# Patient Record
Sex: FEMALE | Race: WHITE | NOT HISPANIC OR LATINO
[De-identification: names, ages, dates, MRNs, and addresses within clinical notes are randomized per-mention and may not be internally consistent; named-entity substitution may affect disease eponyms.]

---

## 2017-08-08 ENCOUNTER — RECORDS - HEALTHEAST (OUTPATIENT)
Dept: ADMINISTRATIVE | Facility: OTHER | Age: 67
End: 2017-08-08

## 2017-08-11 ENCOUNTER — OFFICE VISIT - HEALTHEAST (OUTPATIENT)
Dept: SURGERY | Facility: CLINIC | Age: 67
End: 2017-08-11

## 2017-08-11 DIAGNOSIS — Z85.3 PERSONAL HISTORY OF BREAST CANCER: ICD-10-CM

## 2017-08-11 RX ORDER — GUAIFENESIN AND DEXTROMETHORPHAN HYDROBROMIDE 1200; 60 MG/1; MG/1
TABLET, EXTENDED RELEASE ORAL
Status: SHIPPED | COMMUNITY
Start: 2017-08-11

## 2017-08-11 RX ORDER — FEXOFENADINE HCL 180 MG/1
180 TABLET ORAL
Status: SHIPPED | COMMUNITY
Start: 2013-05-20

## 2017-08-11 RX ORDER — EPINEPHRINE 0.3 MG/.3ML
0.3 INJECTION SUBCUTANEOUS
Status: SHIPPED | COMMUNITY
Start: 2016-10-05

## 2017-08-11 RX ORDER — MONTELUKAST SODIUM 10 MG/1
10 TABLET ORAL
Status: SHIPPED | COMMUNITY
Start: 2017-04-13

## 2017-08-11 RX ORDER — ACETIC ACID 20.65 MG/ML
SOLUTION AURICULAR (OTIC)
Status: SHIPPED | COMMUNITY
Start: 2016-12-16

## 2017-08-11 ASSESSMENT — MIFFLIN-ST. JEOR: SCORE: 1238.23

## 2018-08-16 ENCOUNTER — HOSPITAL ENCOUNTER (OUTPATIENT)
Dept: SURGERY | Facility: CLINIC | Age: 68
Discharge: HOME | End: 2018-08-16
Attending: SPECIALIST

## 2018-08-16 DIAGNOSIS — Z85.3 PERSONAL HISTORY OF BREAST CANCER: ICD-10-CM

## 2019-08-15 ENCOUNTER — RECORDS - HEALTHEAST (OUTPATIENT)
Dept: ADMINISTRATIVE | Facility: OTHER | Age: 69
End: 2019-08-15

## 2019-09-26 ENCOUNTER — HOSPITAL ENCOUNTER (OUTPATIENT)
Dept: SURGERY | Facility: CLINIC | Age: 69
Discharge: HOME | End: 2019-09-26
Attending: SPECIALIST

## 2019-09-26 DIAGNOSIS — Z85.3 PERSONAL HISTORY OF BREAST CANCER: ICD-10-CM

## 2019-09-26 ASSESSMENT — MIFFLIN-ST. JEOR: SCORE: 1257.51

## 2021-05-24 ENCOUNTER — RECORDS - HEALTHEAST (OUTPATIENT)
Dept: ADMINISTRATIVE | Facility: CLINIC | Age: 71
End: 2021-05-24

## 2021-05-26 ENCOUNTER — RECORDS - HEALTHEAST (OUTPATIENT)
Dept: ADMINISTRATIVE | Facility: CLINIC | Age: 71
End: 2021-05-26

## 2021-05-28 ENCOUNTER — RECORDS - HEALTHEAST (OUTPATIENT)
Dept: ADMINISTRATIVE | Facility: CLINIC | Age: 71
End: 2021-05-28

## 2021-05-31 VITALS — WEIGHT: 157.5 LBS | BODY MASS INDEX: 25.31 KG/M2 | HEIGHT: 66 IN

## 2021-06-01 VITALS — BODY MASS INDEX: 25.4 KG/M2 | WEIGHT: 155 LBS

## 2021-06-01 NOTE — PROGRESS NOTES
"[facility] for [facility] follow up appointment with Dr. Higginbotham.  She is many years out from her diagnosis of breast cancer.  She continues to see Dr. Rivera for medical oncology. She is feeling good.  She has no new breast concerns.   RN assessment and EMR update.  /78 (Patient Site: Left Arm, Patient Position: Sitting)   Pulse 94   Resp 16   Ht 5' 6\" (1.676 m)   Wt 160 lb (72.6 kg)   SpO2 95%   BMI 25.82 kg/m  .  Patient met with Dr. Higginbotham, see dictation for details.  She will plan to come see Dr. Higginbotham, per her choice, in a year with mammogram.  RN time 12 mins  "

## 2021-06-01 NOTE — PROGRESS NOTES
"This is a 69 y.o. woman who comes in for  continued follow-up of her right breast cancer.  She is now many years  status post right partial mastectomy with radiation.  She is feeling well.  She has no new complaints today.      Please see the chart review for PMH, Meds, allergies, FH and SH.    ROS:  A 12 point comprehensive review of systems was negative except as noted.      Physical Exam:  /78 (Patient Site: Left Arm, Patient Position: Sitting)   Pulse 94   Resp 16   Ht 5' 6\" (1.676 m)   Wt 160 lb (72.6 kg)   SpO2 95%   BMI 25.82 kg/m    General appearance: alert, appears stated age and cooperative  Breasts: There are no changes in the palpable thickening in the superior aspect of the right breast.  There are moderate radiation changes. The scar has healed up well.  Lymph nodes: Cervical, supraclavicular, and axillary nodes normal.  Neurologic: Grossly normal    Data Review: Reviewed her current mammograms. No evidence of disease.      Impression: Personal History of breast cancer. NOD.  Is overall doing very well.  Again, I did tell her she could just continue to follow-up with her primary physician but she really likes to see me on a yearly basis.    Plan: Follow up next year after her mammograms.    "

## 2021-06-02 ENCOUNTER — RECORDS - HEALTHEAST (OUTPATIENT)
Dept: ADMINISTRATIVE | Facility: CLINIC | Age: 71
End: 2021-06-02

## 2021-06-03 VITALS — BODY MASS INDEX: 25.71 KG/M2 | HEIGHT: 66 IN | WEIGHT: 160 LBS

## 2021-06-12 NOTE — PROGRESS NOTES
"This is a 67 y.o. woman who comes in for  continued follow-up of her right breast cancer.  She is now many years  status post right partial mastectomy with radiation.  She is feeling well.  She has no new complaints today.      Please see the chart review for PMH, Meds, allergies, FH and SH.    ROS:  A 12 point comprehensive review of systems was negative except as noted.      Physical Exam:  /73 (Patient Site: Left Arm, Patient Position: Sitting, Cuff Size: Adult Regular)  Pulse 86  Ht 5' 5.5\" (1.664 m)  Wt 157 lb 8 oz (71.4 kg)  SpO2 98%  Breastfeeding? No  BMI 25.81 kg/m2  General appearance: alert, appears stated age and cooperative  Breasts: There are no palpable masses other than the lumpectomy site. There are moderate radiation changes. The breast feels unchanged from previous years.  Lymph nodes: Cervical, supraclavicular, and axillary nodes normal.  Neurologic: Grossly normal    Data Review: Reviewed her current mammograms. No evidence of disease.      Impression: Personal History of breast cancer. Continues to do well.    Plan: Follow up in 1 year with bilateral mammograms.    "

## 2021-06-16 PROBLEM — L03.211 FACIAL CELLULITIS: Status: ACTIVE | Noted: 2018-04-26

## 2021-06-19 NOTE — PROGRESS NOTES
Chief Complaint   Patient presents with     Annual Exam     patient states no troubles or  worries

## 2021-06-19 NOTE — PROGRESS NOTES
This is a 68 y.o. woman who comes in for  continued follow-up of her right breast cancer.  She is now many years  status post right partial mastectomy with radiation.  She is feeling well.  She has no new complaints today.      Please see the chart review for PMH, Meds, allergies, FH and SH.    ROS:  A 12 point comprehensive review of systems was negative except as noted.      Physical Exam:  /60 (Patient Site: Left Arm, Patient Position: Sitting, Cuff Size: Adult Regular)  Pulse 60  Wt 155 lb (70.3 kg)  SpO2 96%  Breastfeeding? No  BMI 25.4 kg/m2  General appearance: alert, appears stated age and cooperative  Breasts: There are no palpable masses. There are minimal radiation changes. The scar has healed up well.  She again has the area of thickening at the lumpectomy site but I think this overall feels better.  Lymph nodes: Cervical, supraclavicular, and axillary nodes normal.  Neurologic: Grossly normal    Data Review: Reviewed her current mammograms. No evidence of disease.      Impression: Personal History of breast cancer. NOD.  Is overall doing very well.  Her prognosis is exceedingly good.  Did tell her that follow-up with me is probably not absolutely needed but she feels more comfortable since her exam is a little bit more difficult.    Plan: Continue with yearly mammograms and will see her back again in a year.

## 2021-07-03 NOTE — ADDENDUM NOTE
Addendum Note by Lombardi, Susan L, RN at 9/26/2019  8:40 AM     Author: Lombardi, Susan L, RN Service: -- Author Type: RN, Care Manager    Filed: 9/26/2019  9:28 AM Date of Service: 9/26/2019  8:40 AM Status: Signed    : Lombardi, Susan L, RN (RN, Care Manager)    Encounter addended by: Lombardi, Susan L, RN on: 9/26/2019  9:28 AM      Actions taken: Clinical Note Signed, Charge Capture section accepted